# Patient Record
Sex: FEMALE | Race: WHITE | Employment: OTHER | URBAN - METROPOLITAN AREA
[De-identification: names, ages, dates, MRNs, and addresses within clinical notes are randomized per-mention and may not be internally consistent; named-entity substitution may affect disease eponyms.]

---

## 2023-02-09 ENCOUNTER — OFFICE VISIT (OUTPATIENT)
Dept: NEUROLOGY | Age: 78
End: 2023-02-09
Payer: COMMERCIAL

## 2023-02-09 VITALS — WEIGHT: 112.6 LBS | DIASTOLIC BLOOD PRESSURE: 59 MMHG | SYSTOLIC BLOOD PRESSURE: 100 MMHG | HEART RATE: 72 BPM

## 2023-02-09 DIAGNOSIS — G20 DYSKINESIA DUE TO PARKINSON'S DISEASE (HCC): ICD-10-CM

## 2023-02-09 DIAGNOSIS — G24.9 DYSKINESIA DUE TO PARKINSON'S DISEASE (HCC): ICD-10-CM

## 2023-02-09 DIAGNOSIS — R25.1 TREMOR: ICD-10-CM

## 2023-02-09 DIAGNOSIS — G20 PARKINSON DISEASE (HCC): Primary | ICD-10-CM

## 2023-02-09 DIAGNOSIS — R29.3 ABNORMAL POSTURE: ICD-10-CM

## 2023-02-09 DIAGNOSIS — R29.3 POSTURAL IMBALANCE: ICD-10-CM

## 2023-02-09 DIAGNOSIS — Z79.899 ENCOUNTER FOR LONG-TERM (CURRENT) USE OF HIGH-RISK MEDICATION: ICD-10-CM

## 2023-02-09 DIAGNOSIS — G47.52 RBD (REM BEHAVIORAL DISORDER): ICD-10-CM

## 2023-02-09 DIAGNOSIS — G90.3 NEUROLOGIC ORTHOSTATIC HYPOTENSION (HCC): ICD-10-CM

## 2023-02-09 DIAGNOSIS — K59.01 CONSTIPATION BY DELAYED COLONIC TRANSIT: ICD-10-CM

## 2023-02-09 PROCEDURE — G8484 FLU IMMUNIZE NO ADMIN: HCPCS | Performed by: PSYCHIATRY & NEUROLOGY

## 2023-02-09 PROCEDURE — 1036F TOBACCO NON-USER: CPT | Performed by: PSYCHIATRY & NEUROLOGY

## 2023-02-09 PROCEDURE — 1123F ACP DISCUSS/DSCN MKR DOCD: CPT | Performed by: PSYCHIATRY & NEUROLOGY

## 2023-02-09 PROCEDURE — 1090F PRES/ABSN URINE INCON ASSESS: CPT | Performed by: PSYCHIATRY & NEUROLOGY

## 2023-02-09 PROCEDURE — G8427 DOCREV CUR MEDS BY ELIG CLIN: HCPCS | Performed by: PSYCHIATRY & NEUROLOGY

## 2023-02-09 PROCEDURE — G8421 BMI NOT CALCULATED: HCPCS | Performed by: PSYCHIATRY & NEUROLOGY

## 2023-02-09 PROCEDURE — G8400 PT W/DXA NO RESULTS DOC: HCPCS | Performed by: PSYCHIATRY & NEUROLOGY

## 2023-02-09 PROCEDURE — G2212 PROLONG OUTPT/OFFICE VIS: HCPCS | Performed by: PSYCHIATRY & NEUROLOGY

## 2023-02-09 PROCEDURE — 99205 OFFICE O/P NEW HI 60 MIN: CPT | Performed by: PSYCHIATRY & NEUROLOGY

## 2023-02-09 RX ORDER — LEVODOPA AND CARBIDOPA 195; 48.75 MG/1; MG/1
CAPSULE, EXTENDED RELEASE ORAL
Qty: 720 CAPSULE | Refills: 3 | Status: SHIPPED | OUTPATIENT
Start: 2023-02-09

## 2023-02-09 RX ORDER — LEVOTHYROXINE SODIUM 50 MCG
50 TABLET ORAL DAILY
COMMUNITY
Start: 2022-12-07

## 2023-02-09 RX ORDER — OLMESARTAN MEDOXOMIL 20 MG/1
20 TABLET ORAL DAILY
COMMUNITY
Start: 2022-12-15

## 2023-02-09 RX ORDER — CARBIDOPA AND LEVODOPA 50; 200 MG/1; MG/1
1 TABLET, EXTENDED RELEASE ORAL NIGHTLY
Qty: 90 TABLET | Refills: 3 | Status: SHIPPED | OUTPATIENT
Start: 2023-02-09

## 2023-02-09 RX ORDER — AMLODIPINE BESYLATE 2.5 MG/1
2.5 TABLET ORAL DAILY
COMMUNITY

## 2023-02-09 RX ORDER — ROSUVASTATIN CALCIUM 10 MG/1
10 TABLET, COATED ORAL DAILY
COMMUNITY
Start: 2022-12-15

## 2023-02-09 RX ORDER — CARBIDOPA AND LEVODOPA 50; 200 MG/1; MG/1
200 TABLET, EXTENDED RELEASE ORAL
COMMUNITY
Start: 2022-12-15 | End: 2023-02-09

## 2023-02-09 ASSESSMENT — ENCOUNTER SYMPTOMS
ABDOMINAL PAIN: 0
PHOTOPHOBIA: 0
VOMITING: 0
ABDOMINAL DISTENTION: 0
DIARRHEA: 0
ALLERGIC/IMMUNOLOGIC NEGATIVE: 1
ANAL BLEEDING: 0
EYE REDNESS: 0
EYE DISCHARGE: 0
BLOOD IN STOOL: 0
NAUSEA: 1
EYE ITCHING: 0
EYE PAIN: 0
RECTAL PAIN: 0
CONSTIPATION: 1
RESPIRATORY NEGATIVE: 1

## 2023-02-09 NOTE — PROGRESS NOTES
02/09/23  [facility]        Chief Complaint:  Chief Complaint   Patient presents with    New Patient             HPI:   Patient is a right - handed, 68 y.o.,,female here for the evaluation/ management of Parkinson's Disease with history of breast cancer with surgery in 2020 and followed by chemo and radiation that was completed in June 2021. Has had lower back surgery in 2015. Also had nephrectomy (left kidney) 2009. Her PD was diagnosed in 2013 with right foot /twitch tremor that began about 2011 when she had a syncopal episode when she got up one night in 11/2011 and she was having . Tremor moved onto left side about 3 years ago. She was started on Sinemet about 6 months into the diagnosis. She was not tried on any other PD medications before that. She is currently taking Sinemet 25/100mg and sinemet CR 50/200mg. Sinemet kicks in but can be unpredictable but if she eats too much before her dose, it will delay the effect of it. But typically it will kick in at about 15-30 mins. Has constipation and taking colace and miralax (not daily). Having daily BM but straining still at times. She is not very good at staying hydrated. She gets about three 16 oz bottles. She feels it works well when she times it correctly and it will last until the next dose but she still can have fluctuations. Can wear off about 15-30 mins. This was why she was asked to add sinemet CR 1/2 tab. This has been her regimen for the past year. For wearing off dyskinesia is an issue, her gait changes with wearing off and she has more tremor. She has confusion when it wears off. She has word finding at these times. She is still having lightheadedness when she stands up. 2 months ago she was taken to the ER after a syncopal event at a restaurant. Her anti-hypertensive medications were adjusted. She is better since then but is still having lightheadedness. Swallowing with some difficulty over the past 6 months. She has to take her pills with water and a straw. She has choked on a pill before. Voice volume is about the same according to her and family. She endorses vivid dreams and has had some dream enactment at times but can control this with being careful about what she watches before bedtime. She is waking up early morning to take thyroid pill when she gets up to urinate. She gets up 4-5 times a night to urinate. She has dry mouth and drinks up until bedtime. She is waking up at times due to dreams. She is not moving at night when she sleeps. She sleeps propped up since her breast cancer surgery. Cramping at night. Balance is \"good\". When her sinemet is working well, she is walking well. But she has started to feel insecure walking with no falls. Current Medications:  Sinemet 25/100mg 1 tab 8am, 11am, 2pm, 5pm  Sinemet CR 50/200mg 1/2 tab at 8am, 11am, 2pm, 8pm     Patient denies:  dizziness or light headedness,  drooling or swallowing issues  constipation,   hallucinations/ visual illusions or impulse control disorder   recent falls. RBD     RLS        Review of Systems:  Review of Systems   Constitutional:  Positive for unexpected weight change. Negative for activity change, appetite change, chills, diaphoresis, fatigue and fever. HENT: Negative. Eyes:  Positive for visual disturbance. Negative for photophobia, pain, discharge, redness and itching. Respiratory: Negative. Cardiovascular: Negative. Gastrointestinal:  Positive for constipation and nausea. Negative for abdominal distention, abdominal pain, anal bleeding, blood in stool, diarrhea, rectal pain and vomiting. Endocrine: Negative. Genitourinary: Negative. Musculoskeletal: Negative. Skin: Negative. Allergic/Immunologic: Negative. Neurological:  Positive for dizziness, tremors, weakness and numbness. Negative for seizures, syncope, facial asymmetry, speech difficulty, light-headedness and headaches. Hematological: Negative. Psychiatric/Behavioral:  Positive for sleep disturbance. Negative for agitation, behavioral problems, confusion, decreased concentration, dysphoric mood, hallucinations, self-injury and suicidal ideas. The patient is nervous/anxious. The patient is not hyperactive. History reviewed. No pertinent past medical history. History reviewed. No pertinent surgical history. History reviewed. No pertinent family history. Social History     Socioeconomic History    Marital status:      Spouse name: Not on file    Number of children: Not on file    Years of education: Not on file    Highest education level: Not on file   Occupational History    Not on file   Tobacco Use    Smoking status: Never     Passive exposure: Never    Smokeless tobacco: Never   Substance and Sexual Activity    Alcohol use:  Yes     Alcohol/week: 1.0 standard drink     Types: 1 Glasses of wine per week    Drug use: Never    Sexual activity: Not on file   Other Topics Concern    Not on file   Social History Narrative    Not on file     Social Determinants of Health     Financial Resource Strain: Not on file   Food Insecurity: Not on file   Transportation Needs: Not on file   Physical Activity: Not on file   Stress: Not on file   Social Connections: Not on file   Intimate Partner Violence: Not on file   Housing Stability: Not on file       Current Outpatient Medications on File Prior to Visit   Medication Sig Dispense Refill    amLODIPine (NORVASC) 2.5 MG tablet Take 2.5 mg by mouth daily      rosuvastatin (CRESTOR) 10 MG tablet Take 10 mg by mouth daily      olmesartan (BENICAR) 20 MG tablet Take 20 mg by mouth daily      SYNTHROID 50 MCG tablet Take 50 mcg by mouth daily      carbidopa-levodopa (SINEMET)  MG per tablet Take 1 tablet by mouth 4 times daily      carbidopa-levodopa (SINEMET CR)  MG per extended release tablet Take 200 mg by mouth 5 (five) times a day      vitamin D (CHOLECALCIFEROL) 25 MCG (1000 UT) TABS tablet Take 1,000 Units by mouth daily       No current facility-administered medications on file prior to visit. Allergies   Allergen Reactions    Levofloxacin Hallucinations, Hives, Other (See Comments), Rash and Shortness Of Breath     Other reaction(s): Sleeplessness             Physical Examination    Vitals:    02/09/23 0902 02/09/23 0913   BP: 110/60 (!) 100/59   Site: Left Upper Arm Left Upper Arm   Position: Sitting Sitting   Pulse: 70 72   Weight: 112 lb 9.6 oz (51.1 kg)          General: No acute distress  Psychiatric: well oriented, normal mood and affect  Cardiovascular: no peripheral edema, no JVD, no carotid bruits, RRR  Pulmonary: CTAB  Skin: No rashes, lesions or ulcers  Ext: no C/C/E      Neurologic Exam  Patient oriented to Person, Place and Time. Language and fund of knowledge intact. CN:Cranial nerves show normal visual fields, pupils equally reactive to light, extraocular movements are intact, Fundoscopic exam shows no papilledema or other abnormalities. facial sensation  Intact and strength is intact, hearing is normal to finger rubs, palate elevates normally, tongue protrudes midline, shoulder shrug is normal.    Motor:RUE 5/5, RLE 5/5, LUE 5/5, LLE 5/5 ,  normal bulk and tone    Reflexes: Patellar reflexes are +2 , Achilles reflexes are 1+ , toes are downgoing. Sensation: Normal  light touch, temperature and vibration throughout     Cerebellar: FTN, HTS intact    Motor Examination: Last dose of sinemet was 2.5 hrs ago.     Voice tremor     Chin tremor      RIGHT LEFT   Tremor at rest 0 0   Postural Tremor of hands 0 0   Action Tremor of hands 0 0   Tremor of Lower extremity 0 0   Open/Close 1 1L>R   Rapid Alternating Movements of Hands 0 0   Finger Taps 2 1   Rigidity - Upper extremity 1 1   Rigidity- Lower extremity  3 2   Foot tapping/LE agility 2 1     Hypophonia 1   Hypomimia 2   Arising from Chair slow   Posture Slightly stooped and leans to the left slightly   Gait Slow with decreased stride and slow turns, decreased arms wing on left. Mild drag of feet intermittently   Pull test 2+   Dyskinesia  Right foot movements increasing    Body Bradykinsesia 2     Assessment/Plan:   Diagnosis Orders   1. Parkinson disease ([address]        2. Tremor        3. Abnormal posture        4. Postural imbalance        5. Encounter for long-term (current) use of high-risk medication        6. RBD (REM behavioral disorder)        7. Constipation by delayed colonic transit        8. Dyskinesia due to Parkinson's disease ([address]        9. Neurologic orthostatic hypotension (HCC)            Tremor predominant PD H&Y stage 2 with wearing off dyskinesia and motor fluctuations. She also has issues with absorption of sinemet around meals. She is wearing off at 2.5 hrs and having significant cramping/dystonia and dyskinesia with return of tremor as she wears off. Today in the the office, she was wearing off. I decided to let her try a dose of Rytary here and see how she responds and she did well. I will stop sinemet and start Rytary 195mg 2 caps QID Q 4 hrs apart. I have given her a sample bottle of Rytary 95mg to take with her in case we need to adjust since they live in [facility]. I hope this will help push her doses out and control the dyskinesia. Add her sinemet CR 50/200mg to 1 cap Qhs.     Constipation: She can also add daily miralax to help with constipation and absorption issues. Time her dose to take an hour before meals or two hours after a meal.    RBD: Add melatonin 1mg and increase if needed for better quality of sleep and control of vivid dreams. OH: Stop norvasc in the AM and check orthostatics BID for the next 5 days and send me the readings. She will still take her bedtime dose since she was having spikes in her BP during the night. May be able to come off this and benicar.          I have spent greater than 50% of the patient's 120 minute visit in review of medical records, counseling for importance of exercise, medications and education of disease and disease progression. Patient is to continue all other medications as directed by prescribing physicians unless addressed above in plan. Continuation of these medications from today's visit are made based on the patient's report of current medications.        Navjot Nieves MD  [address] [facility] Neurology  Director Movement Disorders Program  [address] [place] [address]    [address] [facility] [address], [address] [facility]  Phone: [phone]  Fax: [phone]

## 2023-02-13 ENCOUNTER — TELEPHONE (OUTPATIENT)
Dept: NEUROLOGY | Age: 78
End: 2023-02-13

## 2023-02-13 NOTE — TELEPHONE ENCOUNTER
Patient states the Cosmo Palomino is not lasting her. She is having a lot of off periods that start 1.5 hours before her next dose. She is having a lot of tremors. She is also having difficulty swallowing them since they are so big. She says she is dehydrated as well.

## 2023-02-14 NOTE — TELEPHONE ENCOUNTER
IS she actually taking the full dose? I ask since at her visit she took that same dose and it was working well for her. This means it is lasting her 3.5 hrs and not 4 hrs. Make sure that is correct in what she is saying. If so then:  1) Why is she dehydrated? This is a big issue to address. 2)Is she taking the Rytary with food or is she still being careful with the timing of meals? 3) How is her constipation? She needs to not be constipated (hydration plays into this)  4) Can she open the capsules and take it this way? If not what she prefers then ask her to try  switching to using Rytary 95mg and take 5 caps at each dose. They are smaller and it is a slightly larger dose. I am asking her to try this dose for a couple of days to see if this an issue with the dose or how she is responding to [address]

## 2023-05-02 ENCOUNTER — OFFICE VISIT (OUTPATIENT)
Dept: NEUROLOGY | Age: 78
End: 2023-05-02
Payer: COMMERCIAL

## 2023-05-02 VITALS
WEIGHT: 108.4 LBS | SYSTOLIC BLOOD PRESSURE: 88 MMHG | OXYGEN SATURATION: 96 % | DIASTOLIC BLOOD PRESSURE: 55 MMHG | HEART RATE: 60 BPM

## 2023-05-02 DIAGNOSIS — Z79.899 ENCOUNTER FOR LONG-TERM (CURRENT) USE OF HIGH-RISK MEDICATION: ICD-10-CM

## 2023-05-02 DIAGNOSIS — K59.01 CONSTIPATION BY DELAYED COLONIC TRANSIT: ICD-10-CM

## 2023-05-02 DIAGNOSIS — R13.19 DYSPHAGIA, NEUROLOGIC: ICD-10-CM

## 2023-05-02 DIAGNOSIS — G90.3 NEUROLOGIC ORTHOSTATIC HYPOTENSION (HCC): ICD-10-CM

## 2023-05-02 DIAGNOSIS — I95.9 HYPOTENSION, UNSPECIFIED HYPOTENSION TYPE: ICD-10-CM

## 2023-05-02 DIAGNOSIS — G20 PARKINSON DISEASE (HCC): Primary | ICD-10-CM

## 2023-05-02 DIAGNOSIS — G24.9 DYSKINESIA DUE TO PARKINSON'S DISEASE (HCC): ICD-10-CM

## 2023-05-02 DIAGNOSIS — R25.1 TREMOR: ICD-10-CM

## 2023-05-02 DIAGNOSIS — G20 DYSKINESIA DUE TO PARKINSON'S DISEASE (HCC): ICD-10-CM

## 2023-05-02 PROCEDURE — G8421 BMI NOT CALCULATED: HCPCS | Performed by: PSYCHIATRY & NEUROLOGY

## 2023-05-02 PROCEDURE — 1123F ACP DISCUSS/DSCN MKR DOCD: CPT | Performed by: PSYCHIATRY & NEUROLOGY

## 2023-05-02 PROCEDURE — 99215 OFFICE O/P EST HI 40 MIN: CPT | Performed by: PSYCHIATRY & NEUROLOGY

## 2023-05-02 PROCEDURE — G8400 PT W/DXA NO RESULTS DOC: HCPCS | Performed by: PSYCHIATRY & NEUROLOGY

## 2023-05-02 PROCEDURE — 1090F PRES/ABSN URINE INCON ASSESS: CPT | Performed by: PSYCHIATRY & NEUROLOGY

## 2023-05-02 PROCEDURE — G8427 DOCREV CUR MEDS BY ELIG CLIN: HCPCS | Performed by: PSYCHIATRY & NEUROLOGY

## 2023-05-02 PROCEDURE — 1036F TOBACCO NON-USER: CPT | Performed by: PSYCHIATRY & NEUROLOGY

## 2023-05-02 RX ORDER — CARBIDOPA AND LEVODOPA 50; 200 MG/1; MG/1
TABLET, EXTENDED RELEASE ORAL
Qty: 450 TABLET | Refills: 3 | Status: SHIPPED | OUTPATIENT
Start: 2023-05-02

## 2023-05-02 ASSESSMENT — ENCOUNTER SYMPTOMS
BACK PAIN: 0
CONSTIPATION: 1

## 2023-05-02 NOTE — PROGRESS NOTES
tablet by mouth daily      vitamin D (CHOLECALCIFEROL) 25 MCG (1000 UT) TABS tablet Take 1 tablet by mouth daily      Carbidopa-Levodopa ER (RYTARY) 48. MG CPCR 2 caps  capsule 3    carbidopa-levodopa (SINEMET CR)  MG per extended release tablet Take 1 tablet by mouth nightly 90 tablet 3     No current facility-administered medications on file prior to visit. Allergies   Allergen Reactions    Levofloxacin Hallucinations, Hives, Other (See Comments), Rash and Shortness Of Breath     Other reaction(s): Sleeplessness             Physical Examination    Vitals:    05/02/23 0907 05/02/23 0922   BP: 102/65 (!) 88/55   Site: Left Upper Arm Left Upper Arm   Position: Sitting Standing   Pulse: 60    SpO2: 96%    Weight: 108 lb 6.4 oz (49.2 kg)          General: No acute distress  Psychiatric: well oriented, normal mood and affect  Cardiovascular: no peripheral edema, no JVD, no carotid bruits, RRR  Pulmonary: CTAB  Skin: No rashes, lesions or ulcers  Ext: no C/C/E      Neurologic Exam  Patient oriented to Person, Place and Time. Language and fund of knowledge intact. CN:  Extraocular movements are intact,facial sensation  Intact and strength is intact, , palate elevates normally, tongue protrudes midline, shoulder shrug is normal.    Motor:RUE 5/5, RLE 5/5, LUE 5/5, LLE 5/5 ,  normal bulk and tone    Reflexes: Patellar reflexes are +2 , Achilles reflexes are 1+ , toes are downgoing. Sensation: Normal  light touch, temperature and vibration throughout     Cerebellar: FTN, HTS intact    Motor Examination: Last dose of sinemet was 2 hrs ago.     Voice tremor       Chin tremor         RIGHT LEFT   Tremor at rest 0 0   Postural Tremor of hands 0 0   Action Tremor of hands 0 0   Tremor of Lower extremity 0 0   Open/Close 1 1L>R   Rapid Alternating Movements of Hands 0 0   Finger Taps 1 2   Rigidity - Upper extremity 1 1   Rigidity- Lower extremity  2 1   Foot tapping/LE agility 2 1      Hypophonia 1

## 2023-05-22 ENCOUNTER — OFFICE VISIT (OUTPATIENT)
Dept: NEUROLOGY | Age: 78
End: 2023-05-22

## 2023-05-22 VITALS
WEIGHT: 109.6 LBS | HEIGHT: 63 IN | BODY MASS INDEX: 19.42 KG/M2 | OXYGEN SATURATION: 97 % | HEART RATE: 32 BPM | SYSTOLIC BLOOD PRESSURE: 106 MMHG | DIASTOLIC BLOOD PRESSURE: 62 MMHG

## 2023-05-22 DIAGNOSIS — G20 PARKINSON DISEASE (HCC): ICD-10-CM

## 2023-05-22 DIAGNOSIS — F41.9 ANXIETY: ICD-10-CM

## 2023-05-22 DIAGNOSIS — G47.52 RBD (REM BEHAVIORAL DISORDER): ICD-10-CM

## 2023-05-22 DIAGNOSIS — G24.9 DYSKINESIA DUE TO PARKINSON'S DISEASE (HCC): ICD-10-CM

## 2023-05-22 DIAGNOSIS — G20 DYSKINESIA DUE TO PARKINSON'S DISEASE (HCC): ICD-10-CM

## 2023-05-22 DIAGNOSIS — F98.8 ATTENTION DEFICIT DISORDER (ADD) IN ADULT: Primary | ICD-10-CM

## 2023-05-22 DIAGNOSIS — R25.1 TREMOR: ICD-10-CM

## 2023-05-22 DIAGNOSIS — Z79.899 ENCOUNTER FOR LONG-TERM (CURRENT) USE OF HIGH-RISK MEDICATION: ICD-10-CM

## 2023-05-22 PROBLEM — G20.B1 DYSKINESIA DUE TO PARKINSON'S DISEASE: Status: ACTIVE | Noted: 2023-05-22

## 2023-05-22 PROBLEM — G20.A1 PARKINSON DISEASE: Status: ACTIVE | Noted: 2023-05-22

## 2023-05-22 RX ORDER — METHYLPHENIDATE HYDROCHLORIDE 10 MG/1
TABLET ORAL
Qty: 30 TABLET | Refills: 0 | Status: SHIPPED | OUTPATIENT
Start: 2023-05-22 | End: 2023-06-22

## 2023-05-22 ASSESSMENT — ENCOUNTER SYMPTOMS: CONSTIPATION: 1

## 2023-05-26 DIAGNOSIS — R41.3 MEMORY IMPAIRMENT: Primary | ICD-10-CM

## 2023-09-07 ENCOUNTER — OFFICE VISIT (OUTPATIENT)
Dept: NEUROLOGY | Age: 78
End: 2023-09-07

## 2023-09-07 VITALS
WEIGHT: 101.6 LBS | HEART RATE: 65 BPM | OXYGEN SATURATION: 97 % | SYSTOLIC BLOOD PRESSURE: 106 MMHG | BODY MASS INDEX: 18 KG/M2 | DIASTOLIC BLOOD PRESSURE: 58 MMHG

## 2023-09-07 DIAGNOSIS — G20 PARKINSON DISEASE (HCC): Primary | ICD-10-CM

## 2023-09-07 DIAGNOSIS — R29.6 FREQUENT FALLS: ICD-10-CM

## 2023-09-07 DIAGNOSIS — Z79.899 ENCOUNTER FOR LONG-TERM (CURRENT) USE OF HIGH-RISK MEDICATION: ICD-10-CM

## 2023-09-07 DIAGNOSIS — F41.1 GAD (GENERALIZED ANXIETY DISORDER): ICD-10-CM

## 2023-09-07 DIAGNOSIS — R25.1 TREMOR: ICD-10-CM

## 2023-09-07 DIAGNOSIS — K59.01 CONSTIPATION BY DELAYED COLONIC TRANSIT: ICD-10-CM

## 2023-09-07 DIAGNOSIS — G24.9 DYSKINESIA DUE TO PARKINSON'S DISEASE (HCC): ICD-10-CM

## 2023-09-07 DIAGNOSIS — G47.52 RBD (REM BEHAVIORAL DISORDER): ICD-10-CM

## 2023-09-07 DIAGNOSIS — F41.9 ANXIETY: ICD-10-CM

## 2023-09-07 DIAGNOSIS — F98.8 ATTENTION DEFICIT DISORDER (ADD) IN ADULT: ICD-10-CM

## 2023-09-07 DIAGNOSIS — G20 DYSKINESIA DUE TO PARKINSON'S DISEASE (HCC): ICD-10-CM

## 2023-09-07 RX ORDER — SERTRALINE HYDROCHLORIDE 25 MG/1
TABLET, FILM COATED ORAL
Qty: 7 TABLET | Refills: 0 | Status: SHIPPED | OUTPATIENT
Start: 2023-09-07

## 2023-09-07 RX ORDER — SERTRALINE HYDROCHLORIDE 25 MG/1
TABLET, FILM COATED ORAL
Qty: 180 TABLET | Refills: 3 | Status: SHIPPED | OUTPATIENT
Start: 2023-09-07

## 2023-09-07 ASSESSMENT — ENCOUNTER SYMPTOMS
BACK PAIN: 1
CONSTIPATION: 1

## 2023-09-07 ASSESSMENT — PATIENT HEALTH QUESTIONNAIRE - PHQ9
SUM OF ALL RESPONSES TO PHQ QUESTIONS 1-9: 0
1. LITTLE INTEREST OR PLEASURE IN DOING THINGS: 0
SUM OF ALL RESPONSES TO PHQ9 QUESTIONS 1 & 2: 0
2. FEELING DOWN, DEPRESSED OR HOPELESS: 0
SUM OF ALL RESPONSES TO PHQ QUESTIONS 1-9: 0

## 2023-09-07 NOTE — PROGRESS NOTES
09/08/23  Tisha Rae        Chief Complaint:  Chief Complaint   Patient presents with    Follow-up     Parkinson's disease       Parkinson's Disease Diagnosed:2013, right foot tremor 2011       HPI:   Tisha Rae, 66 y.o.,female here in clinic follow up for continued management of Parkinson's Disease with generalized and wearing off dyskinesia. Food impacts her absorption of sinemet as well. OCD and anxiety as well as trouble with focus and attention and I started her on ritalin at last visit. But she has not tried it yet. But I do feel that she has cognitive issues as well. I had set her up to work with speech therapy on cognitive skills and organizing and she failed to follow through. Has significant anxiety and this impacts her dyskinesia as well. Spouse is concerned about her anxiety level. As are her daughters. She is more tearful lately. She is more anxious as her dose wears off at times. Her best days are the days she is not eating as much and so she avoids eating. Then she gets concerned about her weight. She has had a fall and hit her head and was seen in the ER and MRI showed that she had a \"small pool of blood\". But I don't have the record or report and I am unsure if this means ICH or just a hematoma. She did not have LOC. But has had some headaches and felt \"funny\". She was taking 2 sinemet in the AM. She seemed to hyper and having more compulsive behavior so the family asked her to go back down to 1.5 tabs in the AM instead of 2 tabs. Constipation is doing \"OK\". She takes miralax daily but may not get the full dose in daily. This causes her constipation. Her weight is staying consistent. Current Neuro related meds:  Sinemet 25/100mg 1.5 tab 8am, 11am, 2pm, 5pm,   Sinemet CR 50/200mg 1 tab 8am, 12pm, 2pm, 5pm and 1 tab at qhs  Melatonin 2.5mg Qhs   Miralax for constipation. Review of Systems   Eyes:  Positive for visual disturbance.    Gastrointestinal:

## 2023-09-08 RX ORDER — LEVODOPA 42 MG/1
4 CAPSULE RESPIRATORY (INHALATION)
Qty: 120 CAPSULE | Refills: 3
Start: 2023-09-08

## 2023-12-13 ENCOUNTER — OFFICE VISIT (OUTPATIENT)
Dept: NEUROLOGY | Age: 78
End: 2023-12-13

## 2023-12-13 VITALS
BODY MASS INDEX: 18.6 KG/M2 | OXYGEN SATURATION: 95 % | DIASTOLIC BLOOD PRESSURE: 61 MMHG | SYSTOLIC BLOOD PRESSURE: 119 MMHG | WEIGHT: 105 LBS | HEART RATE: 69 BPM

## 2023-12-13 DIAGNOSIS — S06.5XAA: ICD-10-CM

## 2023-12-13 DIAGNOSIS — R29.6 FREQUENT FALLS: ICD-10-CM

## 2023-12-13 DIAGNOSIS — F98.8 ATTENTION DEFICIT DISORDER (ADD) IN ADULT: ICD-10-CM

## 2023-12-13 DIAGNOSIS — R29.3 POSTURAL IMBALANCE: ICD-10-CM

## 2023-12-13 DIAGNOSIS — Z79.899 ENCOUNTER FOR LONG-TERM (CURRENT) USE OF HIGH-RISK MEDICATION: ICD-10-CM

## 2023-12-13 DIAGNOSIS — G20.B2 PARKINSON'S DISEASE WITH DYSKINESIA AND FLUCTUATING MANIFESTATIONS: Primary | ICD-10-CM

## 2023-12-13 DIAGNOSIS — F41.1 GAD (GENERALIZED ANXIETY DISORDER): ICD-10-CM

## 2023-12-13 ASSESSMENT — PATIENT HEALTH QUESTIONNAIRE - PHQ9
1. LITTLE INTEREST OR PLEASURE IN DOING THINGS: 0
SUM OF ALL RESPONSES TO PHQ QUESTIONS 1-9: 0
2. FEELING DOWN, DEPRESSED OR HOPELESS: 0
DEPRESSION UNABLE TO ASSESS: FUNCTIONAL CAPACITY MOTIVATION LIMITS ACCURACY
SUM OF ALL RESPONSES TO PHQ9 QUESTIONS 1 & 2: 0
SUM OF ALL RESPONSES TO PHQ QUESTIONS 1-9: 0

## 2023-12-13 ASSESSMENT — ENCOUNTER SYMPTOMS
CONSTIPATION: 0
BACK PAIN: 0

## 2023-12-13 NOTE — PROGRESS NOTES
1 1L>R   Rapid Alternating Movements of Hands 0 0   Finger Taps 1 1   Rigidity - Upper extremity 1 1   Rigidity- Lower extremity  1 1   Foot tapping/LE agility 1 1      Hypophonia 1   Hypomimia 2   Arising from Chair slow   Posture Slightly stooped and leans to the left slightly   Gait Slow with decreased stride and slow turns, decreased arms wing on left. Mild drag of feet intermittently   Pull test 2+   Dyskinesia  persistent dyskinesia and restlessness R>L worse with focus   Body Bradykinsesia 2    2    Assessment/Plan:   Diagnosis Orders   1. Parkinson's disease with dyskinesia and fluctuating manifestations        2. Subdural hematoma due to concussion, with unknown loss of consciousness status, initial encounter ([address])  CT HEAD WO CONTRAST      3. Attention deficit disorder (ADD) in adult        4. Encounter for long-term (current) use of high-risk medication        5. JAGDISH (generalized anxiety disorder)        6. Postural imbalance        7. Frequent falls            Today I had Mrs. Oconnell undergo [place] for continued concern of short term memory changes vs anxiety playing into her ability to focus with baseline ADHD and what seems to be becoming more of obsessive compulsive behaviors and often leading to poor judgement and frequent falls. We discussed this today. She still has not tried ritalin. I will increase her sertaline to 50mg Qday to help with this issue. She is wearing off early, by about 30 mins at times but not consistently. She can take it a little early when this happens but I would like for her to keep track of how often this is happening in case we need to adjust her doses. I would also consider adding amantadine for continued dyskinesia if this continues despite the increase in sertraline.      OBJECTIVE:  Tests Administered:  Trails A, Trails B, Digit Symbol Substitution, Stroop, Flanker, Immediate Recognition, Delayed  Recognition, Coordination Test    Test Results:  Cognitive testing
in plan. Continuation of these medications from today's visit are made based on the patient's report of current medications.      [facility]  Director Movement Disorders Program  [address] [place] [address] Adi Cowan  [address] [place] [address] Dr. Collin Lynn [address] [facility], [address] [place] [address]  Phone: [phone]  Fax: [phone]

## 2024-01-08 ENCOUNTER — HOSPITAL ENCOUNTER (OUTPATIENT)
Dept: CT IMAGING | Age: 79
Discharge: HOME | End: 2024-01-11
Attending: PSYCHIATRY & NEUROLOGY

## 2024-01-08 DIAGNOSIS — S06.5XAA: ICD-10-CM

## 2024-03-28 NOTE — PROGRESS NOTES
04/05/24  Yadi Oconnell        Chief Complaint:  Chief Complaint   Patient presents with    Follow-up       Parkinson's disease         Parkinson's Disease Diagnosed: 2013, right foot tremor 2011         HPI:   Yadi Oconnell, 78 y.o.,female here in clinic follow up for continued management of Parkinson's Disease with generalized and wearing off dyskinesia. Food impacts her absorption of sinemet as well. OCD and anxiety as well as trouble with focus and attention. She is getting very hyper and unable to slow down with burst of energy that begins at 5pm this will last for 2-3 hrs and then she will say that she just can't make herself stop.    She has less wearing off painful dystonia. But she is still having dyskinesia and hyperactive and impulsive.     Fell 2 weeks ago and hit her head did not go to Dr, head hurts all over, very sore, and feels like its buzzing, daily headaches    Current Neuro related meds:  Sinemet 25/100mg 1.5 tab 8am, 11am, 2pm, 5pm,   Sinemet CR 50/200mg 1 tab 8am, 11pm, 2pm, 5pm and 1 tab at qhs  Melatonin 2mg Qhs   Zoloft 50mg Q Day  Miralax for constipation.           Review of Systems   Constitutional:  Negative for appetite change.   HENT:  Negative for hearing loss and tinnitus.    Eyes:  Positive for visual disturbance.   Gastrointestinal:  Positive for constipation.   Musculoskeletal:  Positive for back pain and neck pain.   Neurological:  Positive for dizziness, tremors, speech difficulty, numbness and headaches. Negative for seizures.   Psychiatric/Behavioral:  Positive for sleep disturbance. Negative for hallucinations. The patient is not nervous/anxious.           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

## 2024-04-04 ENCOUNTER — OFFICE VISIT (OUTPATIENT)
Dept: NEUROLOGY | Age: 79
End: 2024-04-04

## 2024-04-04 VITALS
OXYGEN SATURATION: 92 % | WEIGHT: 102 LBS | DIASTOLIC BLOOD PRESSURE: 70 MMHG | BODY MASS INDEX: 18.07 KG/M2 | HEART RATE: 72 BPM | SYSTOLIC BLOOD PRESSURE: 129 MMHG

## 2024-04-04 DIAGNOSIS — R29.3 POSTURAL IMBALANCE: ICD-10-CM

## 2024-04-04 DIAGNOSIS — R25.1 TREMOR: ICD-10-CM

## 2024-04-04 DIAGNOSIS — G20.B1 PARKINSON'S DISEASE WITH DYSKINESIA WITHOUT FLUCTUATING MANIFESTATIONS (HCC): Primary | ICD-10-CM

## 2024-04-04 DIAGNOSIS — G47.52 RBD (REM BEHAVIORAL DISORDER): ICD-10-CM

## 2024-04-04 DIAGNOSIS — F41.1 GAD (GENERALIZED ANXIETY DISORDER): ICD-10-CM

## 2024-04-04 DIAGNOSIS — Z79.899 ENCOUNTER FOR LONG-TERM (CURRENT) USE OF HIGH-RISK MEDICATION: ICD-10-CM

## 2024-04-04 DIAGNOSIS — R29.3 ABNORMAL POSTURE: ICD-10-CM

## 2024-04-04 DIAGNOSIS — F90.2 ATTENTION DEFICIT HYPERACTIVITY DISORDER (ADHD), COMBINED TYPE: ICD-10-CM

## 2024-04-04 RX ORDER — CARBIDOPA AND LEVODOPA 25; 100 MG/1; MG/1
TABLET, EXTENDED RELEASE ORAL
Qty: 720 TABLET | Refills: 3 | Status: SHIPPED | OUTPATIENT
Start: 2024-04-04

## 2024-04-04 RX ORDER — LEVOTHYROXINE SODIUM 75 MCG
75 TABLET ORAL DAILY
COMMUNITY
Start: 2024-03-18

## 2024-04-04 RX ORDER — AMANTADINE HYDROCHLORIDE 100 MG/1
100 CAPSULE, GELATIN COATED ORAL 2 TIMES DAILY
Qty: 180 CAPSULE | Refills: 3 | Status: SHIPPED | OUTPATIENT
Start: 2024-04-04

## 2024-04-04 ASSESSMENT — ENCOUNTER SYMPTOMS
CONSTIPATION: 1
BACK PAIN: 1

## 2024-04-05 PROBLEM — F98.8 ATTENTION DEFICIT DISORDER (ADD) IN ADULT: Status: RESOLVED | Noted: 2023-05-22 | Resolved: 2024-04-05

## 2024-07-23 ENCOUNTER — OFFICE VISIT (OUTPATIENT)
Dept: NEUROLOGY | Age: 79
End: 2024-07-23
Payer: COMMERCIAL

## 2024-07-23 VITALS
OXYGEN SATURATION: 97 % | WEIGHT: 104 LBS | HEART RATE: 79 BPM | DIASTOLIC BLOOD PRESSURE: 55 MMHG | BODY MASS INDEX: 18.42 KG/M2 | SYSTOLIC BLOOD PRESSURE: 108 MMHG

## 2024-07-23 DIAGNOSIS — G20.B1 PARKINSON'S DISEASE WITH DYSKINESIA WITHOUT FLUCTUATING MANIFESTATIONS (HCC): Primary | ICD-10-CM

## 2024-07-23 DIAGNOSIS — F90.2 ATTENTION DEFICIT HYPERACTIVITY DISORDER (ADHD), COMBINED TYPE: ICD-10-CM

## 2024-07-23 DIAGNOSIS — I95.2 HYPOTENSION DUE TO DRUGS: ICD-10-CM

## 2024-07-23 DIAGNOSIS — R25.1 TREMOR: ICD-10-CM

## 2024-07-23 DIAGNOSIS — G47.52 RBD (REM BEHAVIORAL DISORDER): ICD-10-CM

## 2024-07-23 DIAGNOSIS — Z79.899 ENCOUNTER FOR LONG-TERM (CURRENT) USE OF HIGH-RISK MEDICATION: ICD-10-CM

## 2024-07-23 PROCEDURE — 1090F PRES/ABSN URINE INCON ASSESS: CPT | Performed by: PSYCHIATRY & NEUROLOGY

## 2024-07-23 PROCEDURE — G8400 PT W/DXA NO RESULTS DOC: HCPCS | Performed by: PSYCHIATRY & NEUROLOGY

## 2024-07-23 PROCEDURE — 1036F TOBACCO NON-USER: CPT | Performed by: PSYCHIATRY & NEUROLOGY

## 2024-07-23 PROCEDURE — G8419 CALC BMI OUT NRM PARAM NOF/U: HCPCS | Performed by: PSYCHIATRY & NEUROLOGY

## 2024-07-23 PROCEDURE — 1123F ACP DISCUSS/DSCN MKR DOCD: CPT | Performed by: PSYCHIATRY & NEUROLOGY

## 2024-07-23 PROCEDURE — G2211 COMPLEX E/M VISIT ADD ON: HCPCS | Performed by: PSYCHIATRY & NEUROLOGY

## 2024-07-23 PROCEDURE — G8427 DOCREV CUR MEDS BY ELIG CLIN: HCPCS | Performed by: PSYCHIATRY & NEUROLOGY

## 2024-07-23 PROCEDURE — 99215 OFFICE O/P EST HI 40 MIN: CPT | Performed by: PSYCHIATRY & NEUROLOGY

## 2024-07-23 RX ORDER — CARBIDOPA AND LEVODOPA 50; 200 MG/1; MG/1
1 TABLET, EXTENDED RELEASE ORAL
COMMUNITY
End: 2024-07-23 | Stop reason: SDUPTHER

## 2024-07-23 RX ORDER — CARBIDOPA AND LEVODOPA 50; 200 MG/1; MG/1
1 TABLET, EXTENDED RELEASE ORAL
Qty: 450 TABLET | Refills: 3 | Status: SHIPPED | OUTPATIENT
Start: 2024-07-23

## 2024-07-23 RX ORDER — OLMESARTAN MEDOXOMIL 5 MG/1
10 TABLET ORAL DAILY
COMMUNITY
Start: 2024-07-11

## 2024-07-23 ASSESSMENT — ENCOUNTER SYMPTOMS
CONSTIPATION: 1
BACK PAIN: 0

## 2024-07-23 NOTE — PROGRESS NOTES
Levofloxacin Hallucinations, Hives, Other (See Comments), Rash and Shortness Of Breath     Other reaction(s): Sleeplessness             Physical Examination    Vitals:    07/23/24 1402   BP: (!) 108/55   Pulse: 79   SpO2: 97%         Neurologic Exam    CN:  Extraocular movements are intact,facial sensation  Intact and strength is intact, palate elevates normally, tongue protrudes midline, shoulder shrug is normal.    Motor:RUE 5/5, RLE 5/5, LUE 5/5, LLE 5/5 ,  normal bulk and tone    Cerebellar: FTN, HTS intact    Motor Examination:       Voice tremor       Chin tremor         RIGHT LEFT   Tremor at rest 0 0   Postural Tremor of hands 0 0   Action Tremor of hands 0 0   Tremor of Lower extremity 0 0   Open/Close 1 1   Rapid Alternating Movements of Hands 0 0   Finger Taps 1 1   Rigidity - Upper extremity 1 1   Rigidity- Lower extremity  1 1   Foot tapping/LE agility 1 1      Hypophonia 1   Hypomimia 2   Arising from Chair slow   Posture Slightly stooped and leans to the left slightly   Gait Slow with decreased stride and slow turns, decreased arms wing on left. Mild drag of feet intermittently   Pull test 2+   Dyskinesia  Only restless behavior noted when she gets excited    Body Bradykinsesia 2        Assessment/Plan:   Diagnosis Orders   1. Parkinson's disease with dyskinesia without fluctuating manifestations (HCC)        2. Tremor        3. Encounter for long-term (current) use of high-risk medication        4. RBD (REM behavioral disorder)        5. Hypotension due to drugs        6. Attention deficit hyperactivity disorder (ADHD), combined type          I am asking that she restart ritalin and take if consistently for at least two weeks so that we can see how she does and to avoid spreading all her meds apart due to her fear of taking medications together. She is complicating her day and adding stress to her day. She is still having a lot of executive function issues and would really benefit from therapy but

## 2024-08-19 DIAGNOSIS — F98.8 ATTENTION DEFICIT DISORDER (ADD) IN ADULT: ICD-10-CM

## 2024-08-19 RX ORDER — METHYLPHENIDATE HYDROCHLORIDE 10 MG/1
TABLET ORAL
Qty: 30 TABLET | Refills: 0 | OUTPATIENT
Start: 2024-08-19

## 2024-10-14 RX ORDER — SERTRALINE HYDROCHLORIDE 25 MG/1
TABLET, FILM COATED ORAL
Qty: 180 TABLET | Refills: 3 | OUTPATIENT
Start: 2024-10-14

## 2024-10-21 NOTE — TELEPHONE ENCOUNTER
RX REFILL REQUEST      Yadi Oconnell  1945    **Medication Name: sertraline    **Medication Dose: 25mg    **Frequency: BID    **[facility]

## 2024-10-23 RX ORDER — SERTRALINE HYDROCHLORIDE 25 MG/1
TABLET, FILM COATED ORAL
Qty: 180 TABLET | Refills: 3 | Status: SHIPPED | OUTPATIENT
Start: 2024-10-23

## 2024-11-01 ENCOUNTER — OFFICE VISIT (OUTPATIENT)
Dept: NEUROLOGY | Age: 79
End: 2024-11-01

## 2024-11-01 VITALS
WEIGHT: 102 LBS | SYSTOLIC BLOOD PRESSURE: 115 MMHG | OXYGEN SATURATION: 95 % | DIASTOLIC BLOOD PRESSURE: 68 MMHG | BODY MASS INDEX: 18.07 KG/M2 | HEART RATE: 68 BPM

## 2024-11-01 DIAGNOSIS — F90.2 ATTENTION DEFICIT HYPERACTIVITY DISORDER (ADHD), COMBINED TYPE: ICD-10-CM

## 2024-11-01 DIAGNOSIS — F41.1 GAD (GENERALIZED ANXIETY DISORDER): ICD-10-CM

## 2024-11-01 DIAGNOSIS — R25.1 TREMOR: ICD-10-CM

## 2024-11-01 DIAGNOSIS — Z79.899 ENCOUNTER FOR LONG-TERM (CURRENT) USE OF HIGH-RISK MEDICATION: ICD-10-CM

## 2024-11-01 DIAGNOSIS — G20.B1 PARKINSON'S DISEASE WITH DYSKINESIA WITHOUT FLUCTUATING MANIFESTATIONS (HCC): ICD-10-CM

## 2024-11-01 DIAGNOSIS — F98.8 ATTENTION DEFICIT DISORDER (ADD) IN ADULT: Primary | ICD-10-CM

## 2024-11-01 DIAGNOSIS — R46.81 OBSESSIVE-COMPULSIVE BEHAVIOR: ICD-10-CM

## 2024-11-01 RX ORDER — MUPIROCIN 20 MG/G
OINTMENT TOPICAL
Qty: 1 G | Refills: 1 | Status: SHIPPED | OUTPATIENT
Start: 2024-11-01 | End: 2024-11-08

## 2024-11-01 RX ORDER — CARBIDOPA AND LEVODOPA 25; 100 MG/1; MG/1
2 TABLET, EXTENDED RELEASE ORAL
COMMUNITY
Start: 2024-09-17

## 2024-11-01 RX ORDER — METHYLPHENIDATE HYDROCHLORIDE 20 MG/1
20 TABLET ORAL DAILY
Qty: 30 TABLET | Refills: 0 | Status: SHIPPED | OUTPATIENT
Start: 2024-11-01 | End: 2024-12-01

## 2024-11-01 ASSESSMENT — ENCOUNTER SYMPTOMS
BACK PAIN: 0
CONSTIPATION: 1

## 2024-11-01 NOTE — PROGRESS NOTES
(current) use of high-risk medication        6. JAGDISH (generalized anxiety disorder)        7. Obsessive-compulsive behavior            She is still having a lot of executive function issues and would really benefit from therapy but does not live anywhere where she can get this.   I still feel like she significant elements of OCD and executive function and has long standing issues with this and what is likely also ADHD.   I have concerns that her issues are more psychiatric in terms of OCD/ADHD and would benefit from counseling as well as considering medication adjustments for these issues.   I would like to refer to psych and speech for executive function as well as counseling.   I will increase ritalin 20mg Q day.   PD is stable at this time. Dyskinesia is an issue when she gets excited. But she is getting right foot turning in and toes curling. Saw podiatrist. I see this is dystonia. We will monitor. She can use antibiotic cream for some skin breakdown. But she could do botox injections.     I have spent 60 minute visit  in counseling for importance of exercise,  medications and education of disease and disease progression.     Patient is to continue all other medications as directed by prescribing physicians unless addressed above in plan. Continuation of these medications from today's visit are made based on the patient's report of current medications.     Vivi Kwan MD  [facility]  Director Movement Disorders Program  [facility] [address]  [address]  [place] [address]  Phone: [phone]  Fax: [phone]

## 2025-02-07 ENCOUNTER — TELEPHONE (OUTPATIENT)
Dept: NEUROLOGY | Age: 80
End: 2025-02-07

## 2025-02-07 NOTE — TELEPHONE ENCOUNTER
Kayce from [facility]'s pharmacy called regarding pts sinemet medication. I called back and confirmed she does take both sinemet IR and CR and that it is not a mistake. She also said she needed a refill on the CR. Will pend below.     CR 25/100mg 2 tabs by mouth 5 times daily 8am, 11pm, 2pm, 5pm and 8pm   
Yossi PGY4

## 2025-02-11 RX ORDER — CARBIDOPA AND LEVODOPA 25; 100 MG/1; MG/1
TABLET, EXTENDED RELEASE ORAL
Qty: 900 TABLET | Refills: 3 | Status: SHIPPED | OUTPATIENT
Start: 2025-02-11

## 2025-04-07 RX ORDER — AMANTADINE HYDROCHLORIDE 100 MG/1
100 CAPSULE, GELATIN COATED ORAL 2 TIMES DAILY
Qty: 180 CAPSULE | Refills: 3 | OUTPATIENT
Start: 2025-04-07

## 2025-04-08 DIAGNOSIS — G20.B1 PARKINSON'S DISEASE WITH DYSKINESIA WITHOUT FLUCTUATING MANIFESTATIONS (HCC): Primary | ICD-10-CM

## 2025-04-08 RX ORDER — AMANTADINE HYDROCHLORIDE 100 MG/1
100 CAPSULE, GELATIN COATED ORAL 2 TIMES DAILY
Qty: 180 CAPSULE | Refills: 3 | Status: SHIPPED | OUTPATIENT
Start: 2025-04-08 | End: 2025-04-08 | Stop reason: SDUPTHER

## 2025-04-08 RX ORDER — AMANTADINE HYDROCHLORIDE 100 MG/1
100 CAPSULE, GELATIN COATED ORAL 2 TIMES DAILY
Qty: 180 CAPSULE | Refills: 3 | Status: SHIPPED | OUTPATIENT
Start: 2025-04-08

## 2025-07-01 ENCOUNTER — OFFICE VISIT (OUTPATIENT)
Dept: NEUROLOGY | Age: 80
End: 2025-07-01
Payer: COMMERCIAL

## 2025-07-01 VITALS — OXYGEN SATURATION: 96 % | HEART RATE: 75 BPM | BODY MASS INDEX: 17 KG/M2 | HEIGHT: 65 IN | WEIGHT: 102 LBS

## 2025-07-01 DIAGNOSIS — F41.1 GAD (GENERALIZED ANXIETY DISORDER): ICD-10-CM

## 2025-07-01 DIAGNOSIS — F02.A11 MILD DEMENTIA DUE TO PARKINSON'S DISEASE, WITH AGITATION (HCC): ICD-10-CM

## 2025-07-01 DIAGNOSIS — R26.9 GAIT ABNORMALITY: ICD-10-CM

## 2025-07-01 DIAGNOSIS — G20.A1 MILD DEMENTIA DUE TO PARKINSON'S DISEASE, WITH AGITATION (HCC): ICD-10-CM

## 2025-07-01 DIAGNOSIS — G20.B2 PARKINSON'S DISEASE WITH DYSKINESIA AND FLUCTUATING MANIFESTATIONS (HCC): Primary | ICD-10-CM

## 2025-07-01 PROCEDURE — G8400 PT W/DXA NO RESULTS DOC: HCPCS | Performed by: PSYCHIATRY & NEUROLOGY

## 2025-07-01 PROCEDURE — 1090F PRES/ABSN URINE INCON ASSESS: CPT | Performed by: PSYCHIATRY & NEUROLOGY

## 2025-07-01 PROCEDURE — 1036F TOBACCO NON-USER: CPT | Performed by: PSYCHIATRY & NEUROLOGY

## 2025-07-01 PROCEDURE — 1123F ACP DISCUSS/DSCN MKR DOCD: CPT | Performed by: PSYCHIATRY & NEUROLOGY

## 2025-07-01 PROCEDURE — G8419 CALC BMI OUT NRM PARAM NOF/U: HCPCS | Performed by: PSYCHIATRY & NEUROLOGY

## 2025-07-01 PROCEDURE — G8427 DOCREV CUR MEDS BY ELIG CLIN: HCPCS | Performed by: PSYCHIATRY & NEUROLOGY

## 2025-07-01 PROCEDURE — 99215 OFFICE O/P EST HI 40 MIN: CPT | Performed by: PSYCHIATRY & NEUROLOGY

## 2025-07-01 NOTE — PROGRESS NOTES
[facility] NEUROLOGY  [address], [address]  [place] [address]  Phone: [phone] Fax [phone]  Bobby Lew MD      Patient: Yadi Oconnell  Provider: Bobby Lew MD    CC:   Chief Complaint   Patient presents with    Follow-up     Family here- they are concerned about dosage- around 2pm patient is super active, talkative, will not sit still. This lasts from 230-bedtime. Has had a lot of recent falls because she is so busy and not thinking. She is hurried and overstimulated. Toes are turning in causing them to rub together, this is an issue because it bothers her at night and fixates on them. Short term memory has progressed. Losing words mid-sentence, gets distracted. Some hallucinations, she is aware of them and knows they aren'     Referring Provider:    History of Present Illness:     Yadi Oconnell is a 80 y.o. RH female who presents for follow up and continued management of Parkinson's disease.     She is accompanied by her family.      Patient presents today for follow up and continued management of Parkinson's disease, diagnosed in 2013.    She is a previous patient of Dr. GONCALVES, last seen November 2024.  Previous records been reviewed.    Current medications include (but not limited to):   Amantadine 100mg BID 11am and 5pm  Sinemet 25/100mg 1.5 tabs 8am, 11am, 2pm, 5pm, and 8pm  Sinemet CR 25/100mg 2 tabs by mouth 5 times daily 8am, 11pm, 2pm, 5pm and 8pm  Melatonin 3mg Qhs   Sertraline 50mg Q Day  Miralax for constipation    Previous medication trials include: N/A    Patient presents today for follow-up.    Increasing concern for worsening cognitive impairment.  She may get very obsessive and fixated on certain tasks.  Seems to have some \"hyperactivity\" which can often get worse in the evening hours.  This is also accompanied by worsening dyskinesias which are notable on exam today.    She did have a previous brain check cognitive screening that was within normal limits.  A previous trial

## 2025-07-02 RX ORDER — RIVASTIGMINE TARTRATE 1.5 MG/1
1.5 CAPSULE ORAL 2 TIMES DAILY
Qty: 60 CAPSULE | Refills: 5 | Status: SHIPPED | OUTPATIENT
Start: 2025-07-02

## 2025-07-07 DIAGNOSIS — G20.B1 PARKINSON'S DISEASE WITH DYSKINESIA WITHOUT FLUCTUATING MANIFESTATIONS (HCC): ICD-10-CM

## 2025-07-08 RX ORDER — AMANTADINE HYDROCHLORIDE 100 MG/1
100 CAPSULE, GELATIN COATED ORAL 2 TIMES DAILY
Qty: 180 CAPSULE | Refills: 3 | Status: SHIPPED | OUTPATIENT
Start: 2025-07-08